# Patient Record
Sex: MALE | Race: WHITE | NOT HISPANIC OR LATINO | Employment: OTHER | URBAN - METROPOLITAN AREA
[De-identification: names, ages, dates, MRNs, and addresses within clinical notes are randomized per-mention and may not be internally consistent; named-entity substitution may affect disease eponyms.]

---

## 2018-02-20 ENCOUNTER — RECORDS - HEALTHEAST (OUTPATIENT)
Dept: LAB | Facility: CLINIC | Age: 73
End: 2018-02-20

## 2018-02-20 LAB
ANION GAP SERPL CALCULATED.3IONS-SCNC: 15 MMOL/L (ref 5–18)
BUN SERPL-MCNC: 26 MG/DL (ref 8–28)
CALCIUM SERPL-MCNC: 8.6 MG/DL (ref 8.5–10.5)
CHLORIDE BLD-SCNC: 97 MMOL/L (ref 98–107)
CO2 SERPL-SCNC: 19 MMOL/L (ref 22–31)
CREAT SERPL-MCNC: 1.22 MG/DL (ref 0.7–1.3)
ERYTHROCYTE [DISTWIDTH] IN BLOOD BY AUTOMATED COUNT: 12.4 % (ref 11–14.5)
GFR SERPL CREATININE-BSD FRML MDRD: 59 ML/MIN/1.73M2
GLUCOSE BLD-MCNC: 121 MG/DL (ref 70–125)
HCT VFR BLD AUTO: 46.6 % (ref 40–54)
HGB BLD-MCNC: 15.6 G/DL (ref 14–18)
MCH RBC QN AUTO: 34.9 PG (ref 27–34)
MCHC RBC AUTO-ENTMCNC: 33.5 G/DL (ref 32–36)
MCV RBC AUTO: 104 FL (ref 80–100)
PLATELET # BLD AUTO: 368 THOU/UL (ref 140–440)
PMV BLD AUTO: 9.8 FL (ref 8.5–12.5)
POTASSIUM BLD-SCNC: 4 MMOL/L (ref 3.5–5)
RBC # BLD AUTO: 4.47 MILL/UL (ref 4.4–6.2)
SODIUM SERPL-SCNC: 131 MMOL/L (ref 136–145)
WBC: 3.6 THOU/UL (ref 4–11)

## 2018-02-21 ENCOUNTER — RECORDS - HEALTHEAST (OUTPATIENT)
Dept: LAB | Facility: CLINIC | Age: 73
End: 2018-02-21

## 2018-02-21 LAB
C DIFF TOX B STL QL: NEGATIVE
RIBOTYPE 027/NAP1/BI: NORMAL

## 2018-02-23 ENCOUNTER — RECORDS - HEALTHEAST (OUTPATIENT)
Dept: LAB | Facility: CLINIC | Age: 73
End: 2018-02-23

## 2018-02-23 LAB
ANION GAP SERPL CALCULATED.3IONS-SCNC: 11 MMOL/L (ref 5–18)
BUN SERPL-MCNC: 11 MG/DL (ref 8–28)
CALCIUM SERPL-MCNC: 8.9 MG/DL (ref 8.5–10.5)
CHLORIDE BLD-SCNC: 106 MMOL/L (ref 98–107)
CO2 SERPL-SCNC: 21 MMOL/L (ref 22–31)
CREAT SERPL-MCNC: 1.09 MG/DL (ref 0.7–1.3)
GFR SERPL CREATININE-BSD FRML MDRD: >60 ML/MIN/1.73M2
GLUCOSE BLD-MCNC: 126 MG/DL (ref 70–125)
POTASSIUM BLD-SCNC: 4.3 MMOL/L (ref 3.5–5)
SODIUM SERPL-SCNC: 138 MMOL/L (ref 136–145)

## 2021-05-26 ENCOUNTER — RECORDS - HEALTHEAST (OUTPATIENT)
Dept: ADMINISTRATIVE | Facility: CLINIC | Age: 76
End: 2021-05-26

## 2024-08-06 ENCOUNTER — THERAPY VISIT (OUTPATIENT)
Dept: PHYSICAL THERAPY | Facility: CLINIC | Age: 79
End: 2024-08-06
Payer: COMMERCIAL

## 2024-08-06 DIAGNOSIS — R26.89 IMPAIRED GAIT AND MOBILITY: ICD-10-CM

## 2024-08-06 DIAGNOSIS — R53.1 DECREASED STRENGTH: ICD-10-CM

## 2024-08-06 DIAGNOSIS — I89.0 LYMPHEDEMA OF BOTH LOWER EXTREMITIES: Primary | ICD-10-CM

## 2024-08-06 DIAGNOSIS — Z91.81 H/O FALL: ICD-10-CM

## 2024-08-06 DIAGNOSIS — R52 PAIN: ICD-10-CM

## 2024-08-06 DIAGNOSIS — L90.5 SCAR CONDITION AND FIBROSIS OF SKIN: ICD-10-CM

## 2024-08-06 DIAGNOSIS — R26.89 IMPAIRMENT OF BALANCE: ICD-10-CM

## 2024-08-06 PROCEDURE — 97162 PT EVAL MOD COMPLEX 30 MIN: CPT | Mod: GP | Performed by: PHYSICAL THERAPIST

## 2024-08-06 PROCEDURE — 97140 MANUAL THERAPY 1/> REGIONS: CPT | Mod: GP | Performed by: PHYSICAL THERAPIST

## 2024-08-06 PROCEDURE — 97110 THERAPEUTIC EXERCISES: CPT | Mod: GP | Performed by: PHYSICAL THERAPIST

## 2024-08-06 NOTE — PROGRESS NOTES
"PHYSICAL THERAPY EVALUATION  Type of Visit: Evaluation       Fall Risk Screen:  Fall screen completed by: PT  Have you fallen 2 or more times in the past year?: Yes  Have you fallen and had an injury in the past year?: No  Is patient a fall risk?: Yes; Department fall risk interventions implemented    Subjective       Presenting condition or subjective complaint: lymphedema  Date of onset: 08/06/24 (date of eval)    Relevant medical history: Arthritis; Depression; Diabetes; High blood pressure; Osteoarthritis; Overweight; Rheumatoid arthritis pt reporting long standing h/o LE edema > 5 years new onset R anteiror lower leg blister now with intact scab PMHX:  arthritis s/p B TKA 2012;  R CYNDI= 2002;  L CYNDI 2015 and cervical & lumbar spondylosis, s/p  B rotator cuff tears 2004, 2016 fracture R fibula s/p fall; 2017 L UE  fracture; 2017 fracture R tibia s/p internal fixation with fused ankle, HTN, DM2, GERD, h/o multiple genitourinary surgetries for ED with most recent 7/10/2014, obesity (BMI=40), recovering ETOH x 6 years; no longer sleeping in bed since 2016; sleeping in electric recliner but stating \"as flat as bed\"    Prior diagnostic imaging/testing results:       Prior therapy history for the same diagnosis, illness or injury: No      Self referred; [employer] Medicare Ad[employer]age  PCP = [facility]; Suad Vazquez MD    Prior Level of Function  Transfers: Independent, Assistive equipment  Ambulation: Independent, Assistive equipment 50' limited first by SOB using 4ww  ADL: Assistive equipment; shower chair, rails in halls, reacher  IADL:  groceries and meds delivered ; no longer able to cook, trialing pre-made meals as states unable to cook and difficulty cleaning    Living Environment  Social support: Alone   Type of home: [facility]   Stairs to enter the home: No       Ramp: No   Stairs inside the home: No       Help at home: None; Home and Yard maintenance tasks  Equipment owned: Walker with wheels ; with seat, " "manual w/c (not using); long handled toenail scissors (8\") , dressing stick    Employment:    retired  Hobbies/Interests:  significantly limited d/t limited mobilty    Patient goals for therapy:  improve health and limit worsening edema of legs    Pain assessment: Pain present; chronic     Objective       EDEMA EVALUATION  Additional history:  Body part affected by edema: legs  If cancer related, treatment:    If not cancer related, problems with veins or cause of swelling:  l  Distance able to walk: 40feet; using 4ww  Time able to stand: 3minutes  Sensation problems in hands/feet: No    Edema etiology: Surgery, CYNDI, TKA, DM2, obesity, insufficient activity, dependent positioning    FUNCTIONAL SCALES  LLIS = 20    Cognitive Status Examination  Orientation: Oriented to person, place and time   Level of Consciousness: Alert  Follows Commands and Answers Questions: 100% of the time  Personal Safety and Judgement: Intact    EDEMA  Skin Condition: Dryness, Intact, Non-pitting, Pitting, mild errythema B lower legs and feet, 1-2+ pretibial and dorsal foot edema; <1+ distal medial thigh edema; moderate fibrosis thorughout B lower legs and extending into medial thighs, shiny, taut skin of lower legs/feet  Scar: Yes  Capillary Refill: Symmetrical  Stemmer Sign: +  Ulceration:  healing 1.5cm scab R anterior shin from \"weeping blister\"    GIRTH MEASUREMENTS: Refer to separate girth measurement flowsheet.   Consider next session    VOLUME LE  Right LE (mL)    Left LE (mL)    LE Volume Comparison Consider next session   % Difference    Head/Neck Volume     Trunk Volume    Genital Volume       RANGE OF MOTION:  R ankle fused in neutral; unable to cross legs or reach feet, UE shoulder flexion limited d/t pain  STRENGTH:  decreased; 5x STS = 0  POSTURE:  moderate thoracic kyphosis, cervical protraction  ACTIVITIES OF DAILY LIVING: sleeps in electric recliner, Ind but reporting increased challenges with cooking and cleaning  BED " MOBILITY:  no longer sleeping in bed  TRANSFERS:  moderate UE support  GAIT/LOCOMOTION: limited to 40' using 4ww; decreased pace  BALANCE:  SLS = 0  SENSATION:  intact to light touch    Assessment & Plan   CLINICAL IMPRESSIONS  Medical Diagnosis: LE lymphedema, decreased gait and mobilty    Treatment Diagnosis: LE lymphedema, fibrosis, impaired gait and mobilty, pain, impaired balance, h/o falls   Impression/Assessment: Patient is a 78 year old male with worsening LE edema with recent fluid filled blister and decreased mobilty complaints.  The following significant findings have been identified: Pain, Decreased ROM/flexibility, Decreased joint mobility, Decreased strength, Impaired balance, Edema, Impaired gait, Impaired muscle performance, Decreased activity tolerance, and Impaired posture. These impairments interfere with their ability to perform self care tasks, recreational activities, household chores, household mobility, and community mobility as compared to previous level of function.     Clinical Decision Making (Complexity):  Clinical Presentation: Evolving/Changing  Clinical Presentation Rationale: based on medical and personal factors listed in PT evaluation  Clinical Decision Making (Complexity): Moderate complexity    PLAN OF CARE  Treatment Interventions:  Interventions: Gait Training, Manual Therapy, Neuromuscular Re-education, Therapeutic Exercise, Self-Care/Home Management    Long Term Goals     PT Goal 1  Goal Identifier: Home program  Goal Description: Patient will be independent in home program to manage conditions of lymphedema and fibrosis. as well as improved gait and mobilty  Rationale: to maximize safety and independence with performance of ADLs and functional tasks;to maximize safety and independence within the home;to maximize safety and independence within the community;to maximize safety and independence with self cares  Target Date: 11/03/24  PT Goal 2  Goal Identifier: Edema /  Volume  Goal Description: Patient will demonstrate decreased volume of LE by 5% to decrease risk of infection.  Rationale: to maximize safety and independence with performance of ADLs and functional tasks;to maximize safety and independence within the home;to maximize safety and independence within the community;to maximize safety and independence with transportation;to maximize safety and independence with self cares  Goal Progress: eval = 1-2+ pretibial pitting and 1+ dorsal feet and distal thighs  Target Date: 11/03/24  PT Goal 3  Goal Identifier: LLIS  Goal Description: Patient will demonstrate an 8 point decrease in LLIS to demonstrate a decreased impact of lymphedema on function.  Rationale: to maximize safety and independence with performance of ADLs and functional tasks;to maximize safety and independence within the home;to maximize safety and independence within the community;to maximize safety and independence with transportation;to maximize safety and independence with self cares  Goal Progress: eval = 20  Target Date: 11/03/24  PT Goal 4  Goal Identifier: walking tolerance  Goal Description: pt to demonstrate ability to perform walking with 4ww x 100' for imrpoved community ambulation  Rationale: to maximize safety and independence with performance of ADLs and functional tasks;to maximize safety and independence within the home;to maximize safety and independence within the community;to maximize safety and independence with self cares  Goal Progress: eval = 40' per pt report  Target Date: 11/03/24      Frequency of Treatment: 4x/week x 2 weeks then decrease to 2x/week x 2 weeks then 1x/week x 4 weeks then 1x/month (pt may pursue home care therapy if qualifies or may opt for limited sessions d/t increased challenges attending appointments)  Duration of Treatment: 90 days    Recommended Referrals to Other Professionals: Occupational Therapy, may consider home care PT /OT to address mobilty and ADLs as well  as lymphedema; unsure if qualifies  Education Assessment:   Learner/Method: Patient;Listening;Demonstration;No Barriers to Learning    Risks and benefits of evaluation/treatment have been explained.   Patient/Family/caregiver agrees with Plan of Care.     Evaluation Time:     PT Eval, Moderate Complexity Minutes (45237): 30     Signing Clinician: Mariana Gaspar, PT        [facility]                                                                                   OUTPATIENT PHYSICAL THERAPY      PLAN OF TREATMENT FOR OUTPATIENT REHABILITATION   Patient's Last Name, First Name, Scott Petersen YOB: 1945   Provider's Name   [facility]   Medical Record No.  [id number]     Onset Date: 08/06/24 (date of eval)  Start of Care Date: 08/06/24     Medical Diagnosis:  LE lymphedema, decreased gait and mobilty      PT Treatment Diagnosis:  LE lymphedema, fibrosis, impaired gait and mobilty, pain, impaired balance, h/o falls Plan of Treatment  Frequency/Duration: 4x/week x 2 weeks then decrease to 2x/week x 2 weeks then 1x/week x 4 weeks then 1x/month (pt may pursue home care therapy if qualifies or may opt for limited sessions d/t increased challenges attending appointments)/ 90 days    Certification date from 08/06/24 to 11/03/24         See note for plan of treatment details and functional goals     Mariana Gaspar, PT                         I CERTIFY THE NEED FOR THESE SERVICES FURNISHED UNDER        THIS PLAN OF TREATMENT AND WHILE UNDER MY CARE .             Physician Signature               Date    X_____________________________________________________                  Referring Provider:  Referred Self   Suad Vazquez MD; [facility] (PCP)    Initial Assessment  See Epic Evaluation- Start of Care Date: 08/06/24

## 2024-08-09 ENCOUNTER — THERAPY VISIT (OUTPATIENT)
Dept: PHYSICAL THERAPY | Facility: REHABILITATION | Age: 79
End: 2024-08-09
Payer: COMMERCIAL

## 2024-08-09 DIAGNOSIS — I89.0 LYMPHEDEMA OF BOTH LOWER EXTREMITIES: Primary | ICD-10-CM

## 2024-08-09 DIAGNOSIS — L90.5 SCAR CONDITION AND FIBROSIS OF SKIN: ICD-10-CM

## 2024-08-09 DIAGNOSIS — R26.89 IMPAIRED GAIT AND MOBILITY: ICD-10-CM

## 2024-08-09 PROCEDURE — 97140 MANUAL THERAPY 1/> REGIONS: CPT | Mod: GP | Performed by: PHYSICAL THERAPIST

## 2024-08-09 PROCEDURE — 97110 THERAPEUTIC EXERCISES: CPT | Mod: GP | Performed by: PHYSICAL THERAPIST

## 2024-08-16 ENCOUNTER — THERAPY VISIT (OUTPATIENT)
Dept: PHYSICAL THERAPY | Facility: REHABILITATION | Age: 79
End: 2024-08-16
Payer: COMMERCIAL

## 2024-08-16 DIAGNOSIS — R26.89 IMPAIRED GAIT AND MOBILITY: ICD-10-CM

## 2024-08-16 DIAGNOSIS — L90.5 SCAR CONDITION AND FIBROSIS OF SKIN: ICD-10-CM

## 2024-08-16 DIAGNOSIS — R53.1 DECREASED STRENGTH: ICD-10-CM

## 2024-08-16 DIAGNOSIS — I89.0 LYMPHEDEMA OF BOTH LOWER EXTREMITIES: Primary | ICD-10-CM

## 2024-08-16 PROCEDURE — 97140 MANUAL THERAPY 1/> REGIONS: CPT | Mod: GP | Performed by: PHYSICAL THERAPIST

## 2024-08-20 ENCOUNTER — THERAPY VISIT (OUTPATIENT)
Dept: PHYSICAL THERAPY | Facility: REHABILITATION | Age: 79
End: 2024-08-20
Payer: COMMERCIAL

## 2024-08-20 DIAGNOSIS — R26.89 IMPAIRED GAIT AND MOBILITY: ICD-10-CM

## 2024-08-20 DIAGNOSIS — R26.89 IMPAIRMENT OF BALANCE: ICD-10-CM

## 2024-08-20 DIAGNOSIS — M62.81 GENERALIZED MUSCLE WEAKNESS: Primary | ICD-10-CM

## 2024-08-20 DIAGNOSIS — L90.5 SCAR CONDITION AND FIBROSIS OF SKIN: ICD-10-CM

## 2024-08-20 DIAGNOSIS — I89.0 LYMPHEDEMA OF BOTH LOWER EXTREMITIES: ICD-10-CM

## 2024-08-20 DIAGNOSIS — R53.1 DECREASED STRENGTH: ICD-10-CM

## 2024-08-20 PROCEDURE — 97140 MANUAL THERAPY 1/> REGIONS: CPT | Mod: CQ | Performed by: PHYSICAL THERAPY ASSISTANT

## 2024-09-03 ENCOUNTER — THERAPY VISIT (OUTPATIENT)
Dept: PHYSICAL THERAPY | Facility: REHABILITATION | Age: 79
End: 2024-09-03
Payer: COMMERCIAL

## 2024-09-03 DIAGNOSIS — R53.1 DECREASED STRENGTH: ICD-10-CM

## 2024-09-03 DIAGNOSIS — Z91.81 H/O FALL: ICD-10-CM

## 2024-09-03 DIAGNOSIS — R26.89 IMPAIRMENT OF BALANCE: ICD-10-CM

## 2024-09-03 DIAGNOSIS — R26.89 IMPAIRED GAIT AND MOBILITY: ICD-10-CM

## 2024-09-03 DIAGNOSIS — M62.81 GENERALIZED MUSCLE WEAKNESS: Primary | ICD-10-CM

## 2024-09-03 DIAGNOSIS — I89.0 LYMPHEDEMA OF BOTH LOWER EXTREMITIES: ICD-10-CM

## 2024-09-03 DIAGNOSIS — L90.5 SCAR CONDITION AND FIBROSIS OF SKIN: ICD-10-CM

## 2024-09-03 PROCEDURE — 97140 MANUAL THERAPY 1/> REGIONS: CPT | Mod: GP | Performed by: PHYSICAL THERAPY ASSISTANT

## 2024-09-06 ENCOUNTER — THERAPY VISIT (OUTPATIENT)
Dept: PHYSICAL THERAPY | Facility: REHABILITATION | Age: 79
End: 2024-09-06
Payer: COMMERCIAL

## 2024-09-06 DIAGNOSIS — I89.0 LYMPHEDEMA OF BOTH LOWER EXTREMITIES: ICD-10-CM

## 2024-09-06 DIAGNOSIS — R26.89 IMPAIRED GAIT AND MOBILITY: ICD-10-CM

## 2024-09-06 DIAGNOSIS — L90.5 SCAR CONDITION AND FIBROSIS OF SKIN: ICD-10-CM

## 2024-09-06 DIAGNOSIS — R53.1 DECREASED STRENGTH: ICD-10-CM

## 2024-09-06 DIAGNOSIS — M62.81 GENERALIZED MUSCLE WEAKNESS: Primary | ICD-10-CM

## 2024-09-06 PROCEDURE — 97140 MANUAL THERAPY 1/> REGIONS: CPT | Mod: GP | Performed by: PHYSICAL THERAPIST

## 2024-09-10 ENCOUNTER — THERAPY VISIT (OUTPATIENT)
Dept: PHYSICAL THERAPY | Facility: REHABILITATION | Age: 79
End: 2024-09-10
Payer: COMMERCIAL

## 2024-09-10 DIAGNOSIS — I89.0 LYMPHEDEMA OF BOTH LOWER EXTREMITIES: ICD-10-CM

## 2024-09-10 DIAGNOSIS — R53.1 DECREASED STRENGTH: ICD-10-CM

## 2024-09-10 DIAGNOSIS — Z91.81 H/O FALL: ICD-10-CM

## 2024-09-10 DIAGNOSIS — L90.5 SCAR CONDITION AND FIBROSIS OF SKIN: ICD-10-CM

## 2024-09-10 DIAGNOSIS — M62.81 GENERALIZED MUSCLE WEAKNESS: Primary | ICD-10-CM

## 2024-09-10 DIAGNOSIS — R26.89 IMPAIRMENT OF BALANCE: ICD-10-CM

## 2024-09-10 DIAGNOSIS — R26.89 IMPAIRED GAIT AND MOBILITY: ICD-10-CM

## 2024-09-10 PROCEDURE — 97140 MANUAL THERAPY 1/> REGIONS: CPT | Mod: CQ | Performed by: PHYSICAL THERAPY ASSISTANT

## 2024-09-13 ENCOUNTER — THERAPY VISIT (OUTPATIENT)
Dept: PHYSICAL THERAPY | Facility: REHABILITATION | Age: 79
End: 2024-09-13
Payer: COMMERCIAL

## 2024-09-13 DIAGNOSIS — I89.0 LYMPHEDEMA OF BOTH LOWER EXTREMITIES: ICD-10-CM

## 2024-09-13 DIAGNOSIS — R53.1 DECREASED STRENGTH: ICD-10-CM

## 2024-09-13 DIAGNOSIS — M62.81 GENERALIZED MUSCLE WEAKNESS: Primary | ICD-10-CM

## 2024-09-13 DIAGNOSIS — R26.89 IMPAIRED GAIT AND MOBILITY: ICD-10-CM

## 2024-09-13 DIAGNOSIS — L90.5 SCAR CONDITION AND FIBROSIS OF SKIN: ICD-10-CM

## 2024-09-13 PROCEDURE — 97140 MANUAL THERAPY 1/> REGIONS: CPT | Mod: GP | Performed by: PHYSICAL THERAPIST

## 2024-09-20 ENCOUNTER — THERAPY VISIT (OUTPATIENT)
Dept: PHYSICAL THERAPY | Facility: REHABILITATION | Age: 79
End: 2024-09-20
Payer: COMMERCIAL

## 2024-09-20 DIAGNOSIS — R53.1 DECREASED STRENGTH: ICD-10-CM

## 2024-09-20 DIAGNOSIS — R26.89 IMPAIRED GAIT AND MOBILITY: ICD-10-CM

## 2024-09-20 DIAGNOSIS — R26.89 IMPAIRMENT OF BALANCE: ICD-10-CM

## 2024-09-20 DIAGNOSIS — M62.81 GENERALIZED MUSCLE WEAKNESS: Primary | ICD-10-CM

## 2024-09-20 DIAGNOSIS — Z91.81 H/O FALL: ICD-10-CM

## 2024-09-20 DIAGNOSIS — I89.0 LYMPHEDEMA OF BOTH LOWER EXTREMITIES: ICD-10-CM

## 2024-09-20 DIAGNOSIS — R52 PAIN: ICD-10-CM

## 2024-09-20 DIAGNOSIS — L90.5 SCAR CONDITION AND FIBROSIS OF SKIN: ICD-10-CM

## 2024-09-20 PROCEDURE — 97140 MANUAL THERAPY 1/> REGIONS: CPT | Mod: GP | Performed by: PHYSICAL THERAPIST
